# Patient Record
(demographics unavailable — no encounter records)

---

## 2025-01-31 NOTE — PHYSICAL EXAM
[Left] : left knee [5___] : hamstring 5[unfilled]/5 [Negative] : negative Jenniffer's [] : no extensor lag [FreeTextEntry8] : calf is soft and compressible, no sign of dvt  [FreeTextEntry9] : recurvatum  [TWNoteComboBox7] : flexion 125 degrees
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
## 2025-01-31 NOTE — DATA REVIEWED
[MRI] : MRI [Left] : left [Knee] : knee [Report was reviewed and noted in the chart] : The report was reviewed and noted in the chart [I independently reviewed and interpreted images and report] : I independently reviewed and interpreted images and report [I reviewed the films/CD] : I reviewed the films/CD [FreeTextEntry1] : 3/21/24 [facility]:  1.  Horizontal undersurface tear the posterior anterior horn of the meniscus. Longitudinal undersurface tear the posterior horn of the medial meniscus. 2.  High-grade cartilage loss and marrow edema and cystic change in the anterior aspect of the medial tibial plateau and posterior third of the medial femoral condyle. 3.  Intermediate to high-grade cartilage loss with subchondral marrow edema in the medial trochlea.   4.  Moderate joint effusion with synovitis and intraarticular bodies. 5.  Superficial infrapatellar soft tissue edema reflecting soft tissue contusion.

## 2025-01-31 NOTE — DISCUSSION/SUMMARY
[de-identified] : 46f s/p work injury 01.04.24 MRI of the right knee (3/2024) showing medial meniscal tear, cartilage loss of the medial tibial plateau, MFC and medial trochlea.  The MRI results and images were reviewed with the patient. The patient was advised of the diagnosis. The natural history of the pathology was explained in full to the patient in layman's terms. All questions were answered.  currently working - no inmate contact  1) updated MRI of the left knee, eval meniscal tear 2) cryotherapy, rest and activity modification 3) rtc after MRI   Entered by Lu Yi acting as scribe. Dr. Hernandez- The documentation recorded by the scribe accurately reflects the service I personally performed and the decisions made by me.
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
## 2025-01-31 NOTE — DISCUSSION/SUMMARY
[de-identified] : 46f s/p work injury 01.04.24 MRI of the right knee (3/2024) showing medial meniscal tear, cartilage loss of the medial tibial plateau, MFC and medial trochlea.  The MRI results and images were reviewed with the patient. The patient was advised of the diagnosis. The natural history of the pathology was explained in full to the patient in layman's terms. All questions were answered.  currently working - no inmate contact  1) updated MRI of the left knee, eval meniscal tear 2) cryotherapy, rest and activity modification 3) rtc after MRI   Entered by Lu Yi acting as scribe. Dr. Hernandez- The documentation recorded by the scribe accurately reflects the service I personally performed and the decisions made by me.

## 2025-01-31 NOTE — DISCUSSION/SUMMARY
[de-identified] : 46f s/p work injury 01.04.24 MRI of the right knee (3/2024) showing medial meniscal tear, cartilage loss of the medial tibial plateau, MFC and medial trochlea.  The MRI results and images were reviewed with the patient. The patient was advised of the diagnosis. The natural history of the pathology was explained in full to the patient in layman's terms. All questions were answered.  currently working - no inmate contact  1) updated MRI of the left knee, eval meniscal tear 2) cryotherapy, rest and activity modification 3) rtc after MRI   Entered by Lu Yi acting as scribe. Dr. Hernandez- The documentation recorded by the scribe accurately reflects the service I personally performed and the decisions made by me.
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
## 2025-01-31 NOTE — WORK
[Partial] : partial [Reveals pre-existing condition(s) that may affect treatment/prognosis] : reveals pre-existing condition(s) that may affect treatment/prognosis [Patient] : patient [No Rx restrictions] : No Rx restrictions. [I provided the services listed above] :  I provided the services listed above. [FreeTextEntry2] : prior surgery and osteoarthritis